# Patient Record
Sex: MALE | Race: WHITE | NOT HISPANIC OR LATINO | Employment: OTHER | URBAN - METROPOLITAN AREA
[De-identification: names, ages, dates, MRNs, and addresses within clinical notes are randomized per-mention and may not be internally consistent; named-entity substitution may affect disease eponyms.]

---

## 2017-03-09 NOTE — PATIENT DISCUSSION
Continue: Refresh Tears (carboxymethylcellulose sodium): drops: 0.5% 1 drop twice a week as needed into both eyes

## 2019-02-13 NOTE — PATIENT DISCUSSION
*Glaucoma Suspect Counseling:  I have explained to the patient that they are at a slightly greater risk of glaucoma. Glaucoma potentially causes loss of peripheral vision due to damage to the optic nerve. I have explained that damage to the optic nerve from glaucoma is irreversible and that the available treatments for glaucoma are designed to prevent further damage if we determine glaucoma is present. I have explained the importance of regular follow-up with dilated eye exams to monitor for possible glaucoma.

## 2019-03-29 NOTE — PATIENT DISCUSSION
Pt has retinal cholesterol plaque. Pt to see cardiologist next week. Will check carotid doppler & 2D echo. Rule out Emboli. Will have pt return in 2 months.

## 2019-06-03 NOTE — PATIENT DISCUSSION
No new Occlusions, No NVI. Plaque further distal, S/P  carotid enderarectomy. explained to pt that visual defects may take  6  months to improve.

## 2019-09-25 NOTE — PATIENT DISCUSSION
Based on today's exam, diagnostic studies, and/or review of records, the determination was made for Laser treatment today.

## 2019-09-25 NOTE — PATIENT DISCUSSION
Discussed retinal diseases that may cause retinal hemorrhage, with no clear diagnosis based on today’s examination/testing.

## 2019-09-25 NOTE — PROCEDURE NOTE: CLINICAL
PROCEDURE NOTE: Focal Laser, Retina OD. Diagnosis: Branch Retinal Vein Occlusion with Macular Edema. Prior to focal laser, risks/benefits/alternatives to laser discussed including loss of vision and patient wished to proceed. Spot size: 50 um. Power: 100 mW. Number of pulses: 03. Patient tolerated procedure well. There were no complications. Post procedure instructions given. Tiffany Canter

## 2021-03-15 PROBLEM — H33.011: Noted: 2021-03-15

## 2021-03-22 ENCOUNTER — NEW PATIENT EMERGENCY (OUTPATIENT)
Dept: URBAN - METROPOLITAN AREA CLINIC 43 | Facility: CLINIC | Age: 71
End: 2021-03-22

## 2021-03-22 DIAGNOSIS — D31.32: ICD-10-CM

## 2021-03-22 DIAGNOSIS — H33.011: ICD-10-CM

## 2021-03-22 DIAGNOSIS — H35.412: ICD-10-CM

## 2021-03-22 DIAGNOSIS — H25.9: ICD-10-CM

## 2021-03-22 PROCEDURE — 92250 FUNDUS PHOTOGRAPHY W/I&R: CPT

## 2021-03-22 PROCEDURE — 92134 CPTRZ OPH DX IMG PST SGM RTA: CPT

## 2021-03-22 PROCEDURE — 92004 COMPRE OPH EXAM NEW PT 1/>: CPT

## 2021-03-22 ASSESSMENT — VISUAL ACUITY
OS_CC: 20/30
OD_CC: CF 5FT

## 2021-03-22 ASSESSMENT — TONOMETRY
OS_IOP_MMHG: 18
OD_IOP_MMHG: 16

## 2021-03-23 ENCOUNTER — RETINA CONSULT (OUTPATIENT)
Dept: URBAN - METROPOLITAN AREA CLINIC 46 | Facility: CLINIC | Age: 71
End: 2021-03-23

## 2021-03-23 DIAGNOSIS — H35.412: ICD-10-CM

## 2021-03-23 DIAGNOSIS — H43.812: ICD-10-CM

## 2021-03-23 DIAGNOSIS — H43.811: ICD-10-CM

## 2021-03-23 DIAGNOSIS — D31.32: ICD-10-CM

## 2021-03-23 DIAGNOSIS — H33.011: ICD-10-CM

## 2021-03-23 PROCEDURE — 99215 OFFICE O/P EST HI 40 MIN: CPT

## 2021-03-23 PROCEDURE — 92202 OPSCPY EXTND ON/MAC DRAW: CPT

## 2021-03-23 PROCEDURE — 92242 FLUORESCEIN&ICG ANGIOGRAPHY: CPT

## 2021-03-23 PROCEDURE — 92250 FUNDUS PHOTOGRAPHY W/I&R: CPT

## 2021-03-23 PROCEDURE — 92134 CPTRZ OPH DX IMG PST SGM RTA: CPT

## 2021-03-23 RX ORDER — OFLOXACIN 3 MG/ML
1 SOLUTION OPHTHALMIC
Start: 2021-03-30

## 2021-03-23 RX ORDER — PREDNISOLONE ACETATE 10 MG/ML
1 SUSPENSION/ DROPS OPHTHALMIC
Start: 2021-03-30

## 2021-03-23 ASSESSMENT — TONOMETRY
OD_IOP_MMHG: 15
OS_IOP_MMHG: 21

## 2021-03-23 ASSESSMENT — VISUAL ACUITY
OS_CC: 20/25
OD_CC: CF 4FT

## 2021-03-24 ENCOUNTER — CATARACT CONSULT (OUTPATIENT)
Dept: URBAN - METROPOLITAN AREA CLINIC 35 | Facility: CLINIC | Age: 71
End: 2021-03-24

## 2021-03-24 ENCOUNTER — PRE-OP/H&P (OUTPATIENT)
Dept: URBAN - METROPOLITAN AREA CLINIC 39 | Facility: CLINIC | Age: 71
End: 2021-03-24

## 2021-03-24 DIAGNOSIS — H43.812: ICD-10-CM

## 2021-03-24 DIAGNOSIS — H35.30: ICD-10-CM

## 2021-03-24 DIAGNOSIS — H43.811: ICD-10-CM

## 2021-03-24 DIAGNOSIS — D31.32: ICD-10-CM

## 2021-03-24 DIAGNOSIS — H33.011: ICD-10-CM

## 2021-03-24 DIAGNOSIS — H35.412: ICD-10-CM

## 2021-03-24 DIAGNOSIS — H25.811: ICD-10-CM

## 2021-03-24 DIAGNOSIS — H25.9: ICD-10-CM

## 2021-03-24 PROCEDURE — 92014 COMPRE OPH EXAM EST PT 1/>: CPT

## 2021-03-24 PROCEDURE — 99211HP H&P OFFICE/OUTPATIENT VISIT, EST

## 2021-03-24 PROCEDURE — 92136TC INTERFEROMETRY - TECHNICAL COMPONENT

## 2021-03-24 RX ORDER — NEPAFENAC 3 MG/ML: 1 SUSPENSION/ DROPS OPHTHALMIC EVERY EVENING

## 2021-03-24 ASSESSMENT — VISUAL ACUITY
OU_CC: J1
OD_CC: 20/400
OD_SC: <J12
OU_CC: 20/25-1
OS_SC: <J12
OU_SC: <J12
OD_SC: CF 6FT
OS_CC: 20/25-1
OS_AM: 20/20
OS_SC: 20/200
OU_SC: 20/200
OS_CC: J2
OD_CC: J6
OS_BAT: 20/40

## 2021-03-24 ASSESSMENT — TONOMETRY
OD_IOP_MMHG: 18
OS_IOP_MMHG: 19

## 2021-03-25 ENCOUNTER — SURGERY/PROCEDURE (OUTPATIENT)
Dept: URBAN - METROPOLITAN AREA CLINIC 35 | Facility: CLINIC | Age: 71
End: 2021-03-25

## 2021-03-25 ENCOUNTER — H&P (OUTPATIENT)
Dept: URBAN - METROPOLITAN AREA SURGERY 14 | Facility: SURGERY | Age: 71
End: 2021-03-25

## 2021-03-25 DIAGNOSIS — H43.812: ICD-10-CM

## 2021-03-25 DIAGNOSIS — H35.412: ICD-10-CM

## 2021-03-25 DIAGNOSIS — H43.811: ICD-10-CM

## 2021-03-25 DIAGNOSIS — H33.011: ICD-10-CM

## 2021-03-25 DIAGNOSIS — H25.811: ICD-10-CM

## 2021-03-25 DIAGNOSIS — D31.32: ICD-10-CM

## 2021-03-25 DIAGNOSIS — H35.30: ICD-10-CM

## 2021-03-25 PROCEDURE — 99211T TECH SERVICE

## 2021-03-25 PROCEDURE — 66984 XCAPSL CTRC RMVL W/O ECP: CPT

## 2021-03-26 ENCOUNTER — CATARACT POST-OP 1-DAY (OUTPATIENT)
Dept: URBAN - METROPOLITAN AREA CLINIC 43 | Facility: CLINIC | Age: 71
End: 2021-03-26

## 2021-03-26 DIAGNOSIS — Z96.1: ICD-10-CM

## 2021-03-26 PROCEDURE — 99024 POSTOP FOLLOW-UP VISIT: CPT

## 2021-03-26 ASSESSMENT — VISUAL ACUITY
OS_SC: 20/400
OD_SC: CF 5FT

## 2021-03-26 ASSESSMENT — TONOMETRY
OD_IOP_MMHG: 15
OS_IOP_MMHG: 17

## 2021-03-29 ENCOUNTER — SURGERY/PROCEDURE (OUTPATIENT)
Dept: URBAN - METROPOLITAN AREA CLINIC 46 | Facility: CLINIC | Age: 71
End: 2021-03-29

## 2021-03-29 DIAGNOSIS — H33.011: ICD-10-CM

## 2021-03-29 PROCEDURE — 67108 REPAIR DETACHED RETINA: CPT

## 2021-03-30 ENCOUNTER — 1 DAY POST-OP (OUTPATIENT)
Dept: URBAN - METROPOLITAN AREA CLINIC 46 | Facility: CLINIC | Age: 71
End: 2021-03-30

## 2021-03-30 DIAGNOSIS — H33.011: ICD-10-CM

## 2021-03-30 PROCEDURE — 99024 POSTOP FOLLOW-UP VISIT: CPT

## 2021-03-30 ASSESSMENT — TONOMETRY: OD_IOP_MMHG: 17

## 2021-04-05 ENCOUNTER — POST-OP (OUTPATIENT)
Dept: URBAN - METROPOLITAN AREA CLINIC 39 | Facility: CLINIC | Age: 71
End: 2021-04-05

## 2021-04-05 DIAGNOSIS — H33.011: ICD-10-CM

## 2021-04-05 PROCEDURE — 99024 POSTOP FOLLOW-UP VISIT: CPT

## 2021-04-05 ASSESSMENT — TONOMETRY: OD_IOP_MMHG: 24

## 2021-04-09 ENCOUNTER — POST-OP (OUTPATIENT)
Dept: URBAN - METROPOLITAN AREA CLINIC 43 | Facility: CLINIC | Age: 71
End: 2021-04-09

## 2021-04-09 DIAGNOSIS — H33.011: ICD-10-CM

## 2021-04-09 PROCEDURE — 99024 POSTOP FOLLOW-UP VISIT: CPT

## 2021-04-09 PROCEDURE — 92134 CPTRZ OPH DX IMG PST SGM RTA: CPT

## 2021-04-09 ASSESSMENT — TONOMETRY: OD_IOP_MMHG: 18

## 2021-04-30 ENCOUNTER — POST-OP (OUTPATIENT)
Dept: URBAN - METROPOLITAN AREA CLINIC 43 | Facility: CLINIC | Age: 71
End: 2021-04-30

## 2021-04-30 DIAGNOSIS — H33.011: ICD-10-CM

## 2021-04-30 PROCEDURE — 99024 POSTOP FOLLOW-UP VISIT: CPT

## 2021-04-30 PROCEDURE — 92134 CPTRZ OPH DX IMG PST SGM RTA: CPT

## 2021-04-30 ASSESSMENT — TONOMETRY: OD_IOP_MMHG: 16

## 2021-05-10 ENCOUNTER — POST-OP (OUTPATIENT)
Dept: URBAN - METROPOLITAN AREA CLINIC 43 | Facility: CLINIC | Age: 71
End: 2021-05-10

## 2021-05-10 DIAGNOSIS — H33.011: ICD-10-CM

## 2021-05-10 PROCEDURE — 99024 POSTOP FOLLOW-UP VISIT: CPT

## 2021-05-10 ASSESSMENT — TONOMETRY
OD_IOP_MMHG: 17
OS_IOP_MMHG: 14

## 2021-05-10 ASSESSMENT — VISUAL ACUITY
OD_CC: CF 3FT
OS_CC: 20/25-2

## 2021-05-14 ENCOUNTER — 1 DAY POST-OP (OUTPATIENT)
Dept: URBAN - METROPOLITAN AREA CLINIC 43 | Facility: CLINIC | Age: 71
End: 2021-05-14

## 2021-05-14 DIAGNOSIS — H33.011: ICD-10-CM

## 2021-05-14 PROCEDURE — 99024 POSTOP FOLLOW-UP VISIT: CPT

## 2021-05-14 RX ORDER — TOBRAMYCIN AND DEXAMETHASONE 1; 3 MG/ML; MG/ML
1 SUSPENSION/ DROPS OPHTHALMIC
Start: 2021-05-14

## 2021-05-14 ASSESSMENT — TONOMETRY: OD_IOP_MMHG: 20

## 2021-05-21 ENCOUNTER — POST-OP (OUTPATIENT)
Dept: URBAN - METROPOLITAN AREA CLINIC 43 | Facility: CLINIC | Age: 71
End: 2021-05-21

## 2021-05-21 DIAGNOSIS — H33.011: ICD-10-CM

## 2021-05-21 PROCEDURE — 99024 POSTOP FOLLOW-UP VISIT: CPT

## 2021-05-21 RX ORDER — CYCLOPENTOLATE HYDROCHLORIDE 10 MG/ML
1 SOLUTION/ DROPS OPHTHALMIC ONCE A DAY
Start: 2021-05-21

## 2021-05-21 RX ORDER — PREDNISOLONE ACETATE 10 MG/ML: 1 SUSPENSION/ DROPS OPHTHALMIC

## 2021-05-21 ASSESSMENT — TONOMETRY: OD_IOP_MMHG: 18

## 2021-05-21 ASSESSMENT — VISUAL ACUITY: OD_CC: CF 2FT

## 2021-05-28 ENCOUNTER — POST-OP (OUTPATIENT)
Dept: URBAN - METROPOLITAN AREA CLINIC 43 | Facility: CLINIC | Age: 71
End: 2021-05-28

## 2021-05-28 DIAGNOSIS — H33.011: ICD-10-CM

## 2021-05-28 PROCEDURE — 99024 POSTOP FOLLOW-UP VISIT: CPT

## 2021-05-28 ASSESSMENT — VISUAL ACUITY
OD_SC: CF 1FT
OS_SC: 20/30+2

## 2021-06-11 ENCOUNTER — POST-OP (OUTPATIENT)
Dept: URBAN - METROPOLITAN AREA CLINIC 43 | Facility: CLINIC | Age: 71
End: 2021-06-11

## 2021-06-11 DIAGNOSIS — H33.011: ICD-10-CM

## 2021-06-11 DIAGNOSIS — H21.1X1: ICD-10-CM

## 2021-06-11 DIAGNOSIS — H40.051: ICD-10-CM

## 2021-06-11 PROCEDURE — 67028 INJECTION EYE DRUG: CPT

## 2021-06-11 PROCEDURE — 99214 OFFICE O/P EST MOD 30 MIN: CPT

## 2021-06-11 PROCEDURE — J3490AVA AVASTIN

## 2021-06-11 RX ORDER — HYDROXYZINE HYDROCHLORIDE 25 MG/1
1 TABLET, FILM COATED ORAL
Start: 2021-06-11

## 2021-06-11 ASSESSMENT — TONOMETRY
OS_IOP_MMHG: 17
OD_IOP_MMHG: 24
OD_IOP_MMHG: 39

## 2021-06-11 ASSESSMENT — VISUAL ACUITY
OD_CC: CF 1FT
OS_CC: 20/20-2

## 2021-06-15 ENCOUNTER — POST-OP (OUTPATIENT)
Dept: URBAN - METROPOLITAN AREA CLINIC 46 | Facility: CLINIC | Age: 71
End: 2021-06-15

## 2021-06-15 DIAGNOSIS — H33.011: ICD-10-CM

## 2021-06-15 DIAGNOSIS — H21.1X1: ICD-10-CM

## 2021-06-15 DIAGNOSIS — H40.051: ICD-10-CM

## 2021-06-15 DIAGNOSIS — Z96.1: ICD-10-CM

## 2021-06-15 PROCEDURE — 99024 POSTOP FOLLOW-UP VISIT: CPT

## 2021-06-15 RX ORDER — TIMOLOL MALEATE 6.8 MG/ML: 1 SOLUTION OPHTHALMIC TWICE A DAY

## 2021-06-15 ASSESSMENT — TONOMETRY
OS_IOP_MMHG: 14
OD_IOP_MMHG: 18

## 2021-06-15 ASSESSMENT — VISUAL ACUITY: OD_SC: CF 2FT

## 2021-06-18 ENCOUNTER — POST-OP (OUTPATIENT)
Dept: URBAN - METROPOLITAN AREA CLINIC 43 | Facility: CLINIC | Age: 71
End: 2021-06-18

## 2021-06-18 DIAGNOSIS — H21.1X1: ICD-10-CM

## 2021-06-18 DIAGNOSIS — H33.011: ICD-10-CM

## 2021-06-18 PROCEDURE — 99024 POSTOP FOLLOW-UP VISIT: CPT

## 2021-06-18 ASSESSMENT — TONOMETRY
OS_IOP_MMHG: 12
OD_IOP_MMHG: 14

## 2021-06-18 ASSESSMENT — VISUAL ACUITY
OD_SC: CF 2FT
OS_SC: 20/25-1

## 2021-06-30 ENCOUNTER — POST-OP (OUTPATIENT)
Dept: URBAN - METROPOLITAN AREA CLINIC 43 | Facility: CLINIC | Age: 71
End: 2021-06-30

## 2021-06-30 DIAGNOSIS — H21.1X1: ICD-10-CM

## 2021-06-30 DIAGNOSIS — H40.051: ICD-10-CM

## 2021-06-30 DIAGNOSIS — H33.011: ICD-10-CM

## 2021-06-30 PROCEDURE — 99024 POSTOP FOLLOW-UP VISIT: CPT

## 2021-06-30 ASSESSMENT — VISUAL ACUITY: OD_SC: CF 3FT

## 2021-06-30 ASSESSMENT — TONOMETRY: OD_IOP_MMHG: 11

## 2021-07-19 ENCOUNTER — POST-OP (OUTPATIENT)
Dept: URBAN - METROPOLITAN AREA CLINIC 43 | Facility: CLINIC | Age: 71
End: 2021-07-19

## 2021-07-19 DIAGNOSIS — H40.051: ICD-10-CM

## 2021-07-19 DIAGNOSIS — H33.011: ICD-10-CM

## 2021-07-19 DIAGNOSIS — H21.1X1: ICD-10-CM

## 2021-07-19 PROCEDURE — 99024 POSTOP FOLLOW-UP VISIT: CPT

## 2021-07-19 RX ORDER — CYCLOPENTOLATE HYDROCHLORIDE 10 MG/ML
1 SOLUTION OPHTHALMIC TWICE A DAY
Start: 2021-07-19

## 2021-07-19 ASSESSMENT — VISUAL ACUITY: OD_CC: CF 4FT

## 2021-07-19 ASSESSMENT — TONOMETRY: OD_IOP_MMHG: 13

## 2021-07-30 ENCOUNTER — POST-OP (OUTPATIENT)
Dept: URBAN - METROPOLITAN AREA CLINIC 43 | Facility: CLINIC | Age: 71
End: 2021-07-30

## 2021-07-30 DIAGNOSIS — H40.051: ICD-10-CM

## 2021-07-30 DIAGNOSIS — Z48.01: ICD-10-CM

## 2021-07-30 DIAGNOSIS — H33.011: ICD-10-CM

## 2021-07-30 DIAGNOSIS — H21.1X1: ICD-10-CM

## 2021-07-30 PROCEDURE — 92134 CPTRZ OPH DX IMG PST SGM RTA: CPT

## 2021-07-30 PROCEDURE — 99024 POSTOP FOLLOW-UP VISIT: CPT

## 2021-07-30 PROCEDURE — 65222 REMOVE FOREIGN BODY FROM EYE: CPT

## 2021-07-30 ASSESSMENT — VISUAL ACUITY
OD_SC: CF 3FT
OS_SC: 20/25-1

## 2021-07-30 ASSESSMENT — TONOMETRY
OS_IOP_MMHG: 16
OD_IOP_MMHG: 12

## 2022-01-31 ENCOUNTER — COMPREHENSIVE EXAM (OUTPATIENT)
Dept: URBAN - METROPOLITAN AREA CLINIC 43 | Facility: CLINIC | Age: 72
End: 2022-01-31

## 2022-01-31 DIAGNOSIS — H21.1X1: ICD-10-CM

## 2022-01-31 DIAGNOSIS — H33.011: ICD-10-CM

## 2022-01-31 DIAGNOSIS — H40.051: ICD-10-CM

## 2022-01-31 DIAGNOSIS — D31.32: ICD-10-CM

## 2022-01-31 DIAGNOSIS — H35.412: ICD-10-CM

## 2022-01-31 DIAGNOSIS — H43.812: ICD-10-CM

## 2022-01-31 PROCEDURE — 92134 CPTRZ OPH DX IMG PST SGM RTA: CPT

## 2022-01-31 PROCEDURE — 92250 FUNDUS PHOTOGRAPHY W/I&R: CPT

## 2022-01-31 PROCEDURE — 99214 OFFICE O/P EST MOD 30 MIN: CPT

## 2022-01-31 RX ORDER — ERYTHROMYCIN 5 MG/G
OINTMENT OPHTHALMIC EVERY EVENING
Start: 2022-01-31

## 2022-01-31 RX ORDER — PREDNISOLONE ACETATE 10 MG/ML
1 SUSPENSION/ DROPS OPHTHALMIC
Start: 2022-01-31

## 2022-01-31 RX ORDER — TIMOLOL MALEATE 6.8 MG/ML: 1 SOLUTION OPHTHALMIC TWICE A DAY

## 2022-01-31 RX ORDER — KETOROLAC TROMETHAMINE 5 MG/ML
1 SOLUTION OPHTHALMIC
Start: 2022-01-31

## 2022-01-31 ASSESSMENT — TONOMETRY
OD_IOP_MMHG: 15
OS_IOP_MMHG: 18

## 2022-01-31 ASSESSMENT — VISUAL ACUITY
OD_CC: CF 2FT
OS_CC: 20/40+1
OD_PH: CF 4FT

## 2022-03-14 ENCOUNTER — ESTABLISHED PATIENT (OUTPATIENT)
Dept: URBAN - METROPOLITAN AREA CLINIC 43 | Facility: CLINIC | Age: 72
End: 2022-03-14

## 2022-03-14 DIAGNOSIS — D31.32: ICD-10-CM

## 2022-03-14 DIAGNOSIS — H33.011: ICD-10-CM

## 2022-03-14 DIAGNOSIS — H35.351: ICD-10-CM

## 2022-03-14 DIAGNOSIS — H40.051: ICD-10-CM

## 2022-03-14 DIAGNOSIS — H35.30: ICD-10-CM

## 2022-03-14 DIAGNOSIS — H43.812: ICD-10-CM

## 2022-03-14 DIAGNOSIS — H35.412: ICD-10-CM

## 2022-03-14 DIAGNOSIS — H21.1X1: ICD-10-CM

## 2022-03-14 PROCEDURE — 99213 OFFICE O/P EST LOW 20 MIN: CPT

## 2022-03-14 PROCEDURE — 92250 FUNDUS PHOTOGRAPHY W/I&R: CPT

## 2022-03-14 ASSESSMENT — VISUAL ACUITY
OD_CC: CF 1FT
OS_CC: 20/25-2

## 2022-03-14 ASSESSMENT — TONOMETRY
OS_IOP_MMHG: 19
OD_IOP_MMHG: 17

## 2022-05-04 ENCOUNTER — ESTABLISHED PATIENT (OUTPATIENT)
Dept: URBAN - METROPOLITAN AREA CLINIC 43 | Facility: CLINIC | Age: 72
End: 2022-05-04

## 2022-05-04 DIAGNOSIS — H43.812: ICD-10-CM

## 2022-05-04 DIAGNOSIS — H35.371: ICD-10-CM

## 2022-05-04 DIAGNOSIS — H21.1X1: ICD-10-CM

## 2022-05-04 DIAGNOSIS — H40.051: ICD-10-CM

## 2022-05-04 DIAGNOSIS — H33.011: ICD-10-CM

## 2022-05-04 DIAGNOSIS — H35.351: ICD-10-CM

## 2022-05-04 DIAGNOSIS — D31.32: ICD-10-CM

## 2022-05-04 DIAGNOSIS — H35.30: ICD-10-CM

## 2022-05-04 DIAGNOSIS — H35.412: ICD-10-CM

## 2022-05-04 PROCEDURE — 92250 FUNDUS PHOTOGRAPHY W/I&R: CPT

## 2022-05-04 PROCEDURE — 99214 OFFICE O/P EST MOD 30 MIN: CPT

## 2022-05-04 ASSESSMENT — TONOMETRY
OS_IOP_MMHG: 18
OD_IOP_MMHG: 19

## 2022-05-04 ASSESSMENT — VISUAL ACUITY
OD_CC: CF 1FT
OS_CC: 20/25+1

## 2022-07-19 ENCOUNTER — FOLLOW UP (OUTPATIENT)
Dept: URBAN - METROPOLITAN AREA CLINIC 46 | Facility: CLINIC | Age: 72
End: 2022-07-19

## 2022-07-19 DIAGNOSIS — D31.32: ICD-10-CM

## 2022-07-19 DIAGNOSIS — H43.812: ICD-10-CM

## 2022-07-19 DIAGNOSIS — H33.011: ICD-10-CM

## 2022-07-19 DIAGNOSIS — H40.051: ICD-10-CM

## 2022-07-19 DIAGNOSIS — H35.412: ICD-10-CM

## 2022-07-19 DIAGNOSIS — H35.371: ICD-10-CM

## 2022-07-19 DIAGNOSIS — H21.1X1: ICD-10-CM

## 2022-07-19 DIAGNOSIS — H35.351: ICD-10-CM

## 2022-07-19 PROCEDURE — 92250 FUNDUS PHOTOGRAPHY W/I&R: CPT

## 2022-07-19 PROCEDURE — 99213 OFFICE O/P EST LOW 20 MIN: CPT

## 2022-07-19 ASSESSMENT — VISUAL ACUITY
OS_CC: 20/20-1
OD_CC: CF 1FT

## 2022-07-19 ASSESSMENT — TONOMETRY
OD_IOP_MMHG: 19
OS_IOP_MMHG: 18

## 2022-07-20 NOTE — PATIENT DISCUSSION
Stressed the importance of controlling vascular risk factors. Partial Purse String (Intermediate) Text: Given the location of the defect and the characteristics of the surrounding skin an intermediate purse string closure was deemed most appropriate.  Undermining was performed circumfirentially around the surgical defect.  A purse string suture was then placed and tightened. Wound tension only allowed a partial closure of the circular defect.

## 2022-07-23 ENCOUNTER — PREPPED CHART (OUTPATIENT)
Dept: URBAN - METROPOLITAN AREA CLINIC 46 | Facility: CLINIC | Age: 72
End: 2022-07-23

## 2022-07-23 DIAGNOSIS — H52.7: ICD-10-CM

## 2022-07-23 PROCEDURE — 92015 DETERMINE REFRACTIVE STATE: CPT

## 2023-01-20 ENCOUNTER — ESTABLISHED PATIENT (OUTPATIENT)
Dept: URBAN - METROPOLITAN AREA CLINIC 43 | Facility: CLINIC | Age: 73
End: 2023-01-20

## 2023-01-20 DIAGNOSIS — H43.812: ICD-10-CM

## 2023-01-20 DIAGNOSIS — D31.32: ICD-10-CM

## 2023-01-20 DIAGNOSIS — H35.30: ICD-10-CM

## 2023-01-20 DIAGNOSIS — H33.011: ICD-10-CM

## 2023-01-20 DIAGNOSIS — H21.1X1: ICD-10-CM

## 2023-01-20 DIAGNOSIS — H35.351: ICD-10-CM

## 2023-01-20 DIAGNOSIS — H35.412: ICD-10-CM

## 2023-01-20 DIAGNOSIS — H35.371: ICD-10-CM

## 2023-01-20 DIAGNOSIS — H40.051: ICD-10-CM

## 2023-01-20 PROCEDURE — 99214 OFFICE O/P EST MOD 30 MIN: CPT

## 2023-01-20 PROCEDURE — 92235 FLUORESCEIN ANGRPH MLTIFRAME: CPT

## 2023-01-20 PROCEDURE — 92250 FUNDUS PHOTOGRAPHY W/I&R: CPT

## 2023-01-20 ASSESSMENT — VISUAL ACUITY
OD_CC: CF 1FT
OS_CC: 20/20

## 2023-01-20 ASSESSMENT — TONOMETRY
OD_IOP_MMHG: 13
OS_IOP_MMHG: 9

## 2023-05-16 ENCOUNTER — POST-OP (OUTPATIENT)
Dept: URBAN - METROPOLITAN AREA CLINIC 46 | Facility: CLINIC | Age: 73
End: 2023-05-16

## 2023-05-16 DIAGNOSIS — D31.32: ICD-10-CM

## 2023-05-16 DIAGNOSIS — H35.351: ICD-10-CM

## 2023-05-16 DIAGNOSIS — H33.011: ICD-10-CM

## 2023-05-16 DIAGNOSIS — H40.051: ICD-10-CM

## 2023-05-16 DIAGNOSIS — H35.371: ICD-10-CM

## 2023-05-16 DIAGNOSIS — H35.30: ICD-10-CM

## 2023-05-16 DIAGNOSIS — H21.1X1: ICD-10-CM

## 2023-05-16 DIAGNOSIS — H35.412: ICD-10-CM

## 2023-05-16 DIAGNOSIS — H43.812: ICD-10-CM

## 2023-05-16 PROCEDURE — 99024 POSTOP FOLLOW-UP VISIT: CPT

## 2023-05-16 PROCEDURE — 92250 FUNDUS PHOTOGRAPHY W/I&R: CPT

## 2023-05-16 RX ORDER — ERYTHROMYCIN 5 MG/G
OINTMENT OPHTHALMIC EVERY EVENING
Start: 2023-05-16

## 2023-05-16 RX ORDER — MOXIFLOXACIN OPHTHALMIC 5 MG/ML
1 SOLUTION/ DROPS OPHTHALMIC
Start: 2023-05-16

## 2023-05-16 RX ORDER — PREDNISOLONE ACETATE 10 MG/ML
1 SUSPENSION/ DROPS OPHTHALMIC
Start: 2023-05-16

## 2023-05-16 ASSESSMENT — TONOMETRY: OD_IOP_MMHG: 08

## 2023-05-26 ENCOUNTER — POST-OP (OUTPATIENT)
Dept: URBAN - METROPOLITAN AREA CLINIC 43 | Facility: CLINIC | Age: 73
End: 2023-05-26

## 2023-05-26 DIAGNOSIS — H35.30: ICD-10-CM

## 2023-05-26 DIAGNOSIS — H33.011: ICD-10-CM

## 2023-05-26 DIAGNOSIS — H21.1X1: ICD-10-CM

## 2023-05-26 DIAGNOSIS — H35.412: ICD-10-CM

## 2023-05-26 DIAGNOSIS — H43.812: ICD-10-CM

## 2023-05-26 DIAGNOSIS — H40.051: ICD-10-CM

## 2023-05-26 DIAGNOSIS — H35.351: ICD-10-CM

## 2023-05-26 DIAGNOSIS — D31.32: ICD-10-CM

## 2023-05-26 DIAGNOSIS — H35.371: ICD-10-CM

## 2023-05-26 PROCEDURE — 92250 FUNDUS PHOTOGRAPHY W/I&R: CPT

## 2023-05-26 PROCEDURE — 99024 POSTOP FOLLOW-UP VISIT: CPT

## 2023-05-26 ASSESSMENT — TONOMETRY
OD_IOP_MMHG: 13
OS_IOP_MMHG: 19

## 2023-05-26 ASSESSMENT — VISUAL ACUITY: OS_CC: 20/25-1

## 2023-06-07 ENCOUNTER — POST-OP (OUTPATIENT)
Dept: URBAN - METROPOLITAN AREA CLINIC 36 | Facility: CLINIC | Age: 73
End: 2023-06-07

## 2023-06-07 DIAGNOSIS — H43.812: ICD-10-CM

## 2023-06-07 DIAGNOSIS — H40.051: ICD-10-CM

## 2023-06-07 DIAGNOSIS — H35.30: ICD-10-CM

## 2023-06-07 DIAGNOSIS — H35.351: ICD-10-CM

## 2023-06-07 DIAGNOSIS — H21.1X1: ICD-10-CM

## 2023-06-07 DIAGNOSIS — H35.371: ICD-10-CM

## 2023-06-07 DIAGNOSIS — D31.32: ICD-10-CM

## 2023-06-07 DIAGNOSIS — H35.412: ICD-10-CM

## 2023-06-07 DIAGNOSIS — H33.011: ICD-10-CM

## 2023-06-07 PROCEDURE — 99024 POSTOP FOLLOW-UP VISIT: CPT

## 2023-06-07 PROCEDURE — 92250 FUNDUS PHOTOGRAPHY W/I&R: CPT

## 2023-06-07 ASSESSMENT — VISUAL ACUITY: OS_CC: 20/20-1

## 2023-06-07 ASSESSMENT — TONOMETRY
OS_IOP_MMHG: 20
OD_IOP_MMHG: 18

## 2023-09-27 ENCOUNTER — APPOINTMENT (RX ONLY)
Dept: URBAN - METROPOLITAN AREA CLINIC 134 | Facility: CLINIC | Age: 73
Setting detail: DERMATOLOGY
End: 2023-09-27

## 2023-09-27 DIAGNOSIS — L81.4 OTHER MELANIN HYPERPIGMENTATION: ICD-10-CM

## 2023-09-27 DIAGNOSIS — D18.0 HEMANGIOMA: ICD-10-CM

## 2023-09-27 DIAGNOSIS — L57.0 ACTINIC KERATOSIS: ICD-10-CM

## 2023-09-27 DIAGNOSIS — L82.1 OTHER SEBORRHEIC KERATOSIS: ICD-10-CM

## 2023-09-27 DIAGNOSIS — L85.3 XEROSIS CUTIS: ICD-10-CM | Status: STABLE

## 2023-09-27 DIAGNOSIS — L57.8 OTHER SKIN CHANGES DUE TO CHRONIC EXPOSURE TO NONIONIZING RADIATION: ICD-10-CM

## 2023-09-27 DIAGNOSIS — D22 MELANOCYTIC NEVI: ICD-10-CM

## 2023-09-27 PROBLEM — D22.5 MELANOCYTIC NEVI OF TRUNK: Status: ACTIVE | Noted: 2023-09-27

## 2023-09-27 PROBLEM — D18.01 HEMANGIOMA OF SKIN AND SUBCUTANEOUS TISSUE: Status: ACTIVE | Noted: 2023-09-27

## 2023-09-27 PROCEDURE — ? COUNSELING

## 2023-09-27 PROCEDURE — ? SUNSCREEN RECOMMENDATIONS

## 2023-09-27 PROCEDURE — ? PRESCRIPTION MEDICATION MANAGEMENT

## 2023-09-27 PROCEDURE — ? PRESCRIPTION

## 2023-09-27 PROCEDURE — 99204 OFFICE O/P NEW MOD 45 MIN: CPT

## 2023-09-27 RX ORDER — FLUOROURACIL 5 MG/G
THIN LAYER CREAM TOPICAL BID
Qty: 40 | Refills: 1 | Status: ERX | COMMUNITY
Start: 2023-09-27

## 2023-09-27 RX ADMIN — FLUOROURACIL THIN LAYER: 5 CREAM TOPICAL at 00:00

## 2023-09-27 ASSESSMENT — LOCATION DETAILED DESCRIPTION DERM
LOCATION DETAILED: SUPERIOR MID FOREHEAD
LOCATION DETAILED: RIGHT INFERIOR CENTRAL MALAR CHEEK
LOCATION DETAILED: RIGHT INFERIOR UPPER BACK
LOCATION DETAILED: LEFT INFERIOR MEDIAL FOREHEAD
LOCATION DETAILED: LEFT MID-UPPER BACK
LOCATION DETAILED: RIGHT MID-UPPER BACK
LOCATION DETAILED: LEFT PROXIMAL DORSAL FOREARM
LOCATION DETAILED: LEFT SUPERIOR MEDIAL UPPER BACK

## 2023-09-27 ASSESSMENT — LOCATION ZONE DERM
LOCATION ZONE: ARM
LOCATION ZONE: TRUNK
LOCATION ZONE: FACE

## 2023-09-27 ASSESSMENT — LOCATION SIMPLE DESCRIPTION DERM
LOCATION SIMPLE: LEFT UPPER BACK
LOCATION SIMPLE: LEFT FOREARM
LOCATION SIMPLE: RIGHT UPPER BACK
LOCATION SIMPLE: LEFT FOREHEAD
LOCATION SIMPLE: RIGHT CHEEK
LOCATION SIMPLE: SUPERIOR FOREHEAD

## 2023-09-27 NOTE — PROCEDURE: PRESCRIPTION MEDICATION MANAGEMENT
Detail Level: Detailed
Render In Strict Bullet Format?: No
Initiate Treatment: Fluorouracil
Plan: Apply a thin layer to affected areas of the face  and forehead bid for 10 days as tolerated.

## 2023-09-27 NOTE — HPI: FULL BODY SKIN EXAMINATION
How Severe Are Your Spot(S)?: moderate
What Is The Reason For Today's Visit?: Initial Full Body Skin Examination
What Is The Reason For Today's Visit? (Being Monitored For X): concerning skin lesions on an annual basis

## 2023-10-06 ENCOUNTER — ESTABLISHED PATIENT (OUTPATIENT)
Dept: URBAN - METROPOLITAN AREA CLINIC 43 | Facility: CLINIC | Age: 73
End: 2023-10-06

## 2023-10-06 DIAGNOSIS — H35.412: ICD-10-CM

## 2023-10-06 DIAGNOSIS — H35.371: ICD-10-CM

## 2023-10-06 DIAGNOSIS — H43.812: ICD-10-CM

## 2023-10-06 DIAGNOSIS — H18.20: ICD-10-CM

## 2023-10-06 DIAGNOSIS — H35.351: ICD-10-CM

## 2023-10-06 DIAGNOSIS — H21.1X1: ICD-10-CM

## 2023-10-06 DIAGNOSIS — D31.32: ICD-10-CM

## 2023-10-06 DIAGNOSIS — H04.123: ICD-10-CM

## 2023-10-06 DIAGNOSIS — H35.30: ICD-10-CM

## 2023-10-06 DIAGNOSIS — H33.011: ICD-10-CM

## 2023-10-06 PROCEDURE — 92250 FUNDUS PHOTOGRAPHY W/I&R: CPT

## 2023-10-06 PROCEDURE — 99214 OFFICE O/P EST MOD 30 MIN: CPT

## 2023-10-06 ASSESSMENT — TONOMETRY
OS_IOP_MMHG: 15
OD_IOP_MMHG: 14

## 2023-10-06 ASSESSMENT — VISUAL ACUITY
OD_CC: CF 3FT
OS_CC: 20/20-1

## 2025-01-17 ENCOUNTER — NEW PATIENT (OUTPATIENT)
Age: 75
End: 2025-01-17

## 2025-01-17 DIAGNOSIS — H25.9: ICD-10-CM

## 2025-01-17 DIAGNOSIS — H35.371: ICD-10-CM

## 2025-01-17 DIAGNOSIS — H18.20: ICD-10-CM

## 2025-01-17 DIAGNOSIS — H43.812: ICD-10-CM

## 2025-01-17 DIAGNOSIS — H35.412: ICD-10-CM

## 2025-01-17 DIAGNOSIS — H21.1X1: ICD-10-CM

## 2025-01-17 DIAGNOSIS — H35.351: ICD-10-CM

## 2025-01-17 DIAGNOSIS — H33.011: ICD-10-CM

## 2025-01-17 DIAGNOSIS — H35.30: ICD-10-CM

## 2025-01-17 DIAGNOSIS — D31.32: ICD-10-CM

## 2025-01-17 DIAGNOSIS — H40.011: ICD-10-CM

## 2025-01-17 DIAGNOSIS — H04.123: ICD-10-CM

## 2025-01-17 PROCEDURE — 92134 CPTRZ OPH DX IMG PST SGM RTA: CPT

## 2025-01-17 PROCEDURE — 92015 DETERMINE REFRACTIVE STATE: CPT

## 2025-01-17 PROCEDURE — 92004 COMPRE OPH EXAM NEW PT 1/>: CPT

## 2025-01-17 PROCEDURE — 92133 CPTRZD OPH DX IMG PST SGM ON: CPT

## 2025-01-30 ENCOUNTER — CONSULTATION/EVALUATION (OUTPATIENT)
Age: 75
End: 2025-01-30

## 2025-01-30 DIAGNOSIS — H40.051: ICD-10-CM

## 2025-01-30 DIAGNOSIS — H43.812: ICD-10-CM

## 2025-01-30 DIAGNOSIS — H40.011: ICD-10-CM

## 2025-01-30 DIAGNOSIS — H21.1X1: ICD-10-CM

## 2025-01-30 DIAGNOSIS — H35.412: ICD-10-CM

## 2025-01-30 DIAGNOSIS — H35.30: ICD-10-CM

## 2025-01-30 DIAGNOSIS — H04.123: ICD-10-CM

## 2025-01-30 DIAGNOSIS — H25.812: ICD-10-CM

## 2025-01-30 DIAGNOSIS — H33.011: ICD-10-CM

## 2025-01-30 DIAGNOSIS — D31.32: ICD-10-CM

## 2025-01-30 DIAGNOSIS — H35.351: ICD-10-CM

## 2025-01-30 DIAGNOSIS — H35.371: ICD-10-CM

## 2025-01-30 DIAGNOSIS — H18.20: ICD-10-CM

## 2025-01-30 PROCEDURE — 99214 OFFICE O/P EST MOD 30 MIN: CPT

## 2025-01-30 PROCEDURE — 92136 OPHTHALMIC BIOMETRY: CPT

## 2025-01-30 PROCEDURE — 92499PMN IMPRIMIS PRED-MOXI-NEPAF 5ML

## 2025-02-27 ENCOUNTER — PRE-OP/H&P (OUTPATIENT)
Age: 75
End: 2025-02-27

## 2025-02-27 ENCOUNTER — SURGERY/PROCEDURE (OUTPATIENT)
Age: 75
End: 2025-02-27

## 2025-02-27 DIAGNOSIS — D31.32: ICD-10-CM

## 2025-02-27 DIAGNOSIS — H18.20: ICD-10-CM

## 2025-02-27 DIAGNOSIS — H40.011: ICD-10-CM

## 2025-02-27 DIAGNOSIS — H04.123: ICD-10-CM

## 2025-02-27 DIAGNOSIS — H25.812: ICD-10-CM

## 2025-02-27 DIAGNOSIS — H35.30: ICD-10-CM

## 2025-02-27 DIAGNOSIS — H40.051: ICD-10-CM

## 2025-02-27 DIAGNOSIS — H35.351: ICD-10-CM

## 2025-02-27 DIAGNOSIS — H33.011: ICD-10-CM

## 2025-02-27 DIAGNOSIS — H35.412: ICD-10-CM

## 2025-02-27 DIAGNOSIS — H43.812: ICD-10-CM

## 2025-02-27 DIAGNOSIS — H35.371: ICD-10-CM

## 2025-02-27 DIAGNOSIS — H21.1X1: ICD-10-CM

## 2025-02-27 PROCEDURE — 99211T TECH SERVICE

## 2025-02-27 PROCEDURE — 66984 XCAPSL CTRC RMVL W/O ECP: CPT

## 2025-02-28 ENCOUNTER — POST-OP (OUTPATIENT)
Age: 75
End: 2025-02-28

## 2025-02-28 DIAGNOSIS — H43.812: ICD-10-CM

## 2025-02-28 DIAGNOSIS — H40.051: ICD-10-CM

## 2025-02-28 DIAGNOSIS — H18.20: ICD-10-CM

## 2025-02-28 DIAGNOSIS — H35.351: ICD-10-CM

## 2025-02-28 DIAGNOSIS — H40.011: ICD-10-CM

## 2025-02-28 DIAGNOSIS — H35.371: ICD-10-CM

## 2025-02-28 DIAGNOSIS — D31.32: ICD-10-CM

## 2025-02-28 DIAGNOSIS — H35.30: ICD-10-CM

## 2025-02-28 DIAGNOSIS — H33.011: ICD-10-CM

## 2025-02-28 DIAGNOSIS — H21.1X1: ICD-10-CM

## 2025-02-28 DIAGNOSIS — Z96.1: ICD-10-CM

## 2025-02-28 DIAGNOSIS — H04.123: ICD-10-CM

## 2025-02-28 DIAGNOSIS — H35.412: ICD-10-CM

## 2025-02-28 PROCEDURE — 99024 POSTOP FOLLOW-UP VISIT: CPT

## 2025-03-18 ENCOUNTER — POST-OP (OUTPATIENT)
Age: 75
End: 2025-03-18

## 2025-03-18 DIAGNOSIS — H35.30: ICD-10-CM

## 2025-03-18 DIAGNOSIS — H35.371: ICD-10-CM

## 2025-03-18 DIAGNOSIS — H40.011: ICD-10-CM

## 2025-03-18 DIAGNOSIS — H43.812: ICD-10-CM

## 2025-03-18 DIAGNOSIS — Z96.1: ICD-10-CM

## 2025-03-18 DIAGNOSIS — H35.351: ICD-10-CM

## 2025-03-18 DIAGNOSIS — D31.32: ICD-10-CM

## 2025-03-18 DIAGNOSIS — H04.123: ICD-10-CM

## 2025-03-18 DIAGNOSIS — H40.051: ICD-10-CM

## 2025-03-18 DIAGNOSIS — H35.412: ICD-10-CM

## 2025-03-18 DIAGNOSIS — H33.011: ICD-10-CM

## 2025-03-18 DIAGNOSIS — H18.20: ICD-10-CM

## 2025-03-18 DIAGNOSIS — H21.1X1: ICD-10-CM

## 2025-03-18 PROCEDURE — 99024 POSTOP FOLLOW-UP VISIT: CPT
